# Patient Record
Sex: FEMALE | Race: WHITE | Employment: FULL TIME | URBAN - METROPOLITAN AREA
[De-identification: names, ages, dates, MRNs, and addresses within clinical notes are randomized per-mention and may not be internally consistent; named-entity substitution may affect disease eponyms.]

---

## 2017-01-04 ENCOUNTER — OFFICE VISIT (OUTPATIENT)
Dept: PSYCHIATRY | Facility: CLINIC | Age: 37
End: 2017-01-04
Payer: COMMERCIAL

## 2017-01-04 VITALS
DIASTOLIC BLOOD PRESSURE: 70 MMHG | BODY MASS INDEX: 19.88 KG/M2 | TEMPERATURE: 98.1 F | OXYGEN SATURATION: 95 % | SYSTOLIC BLOOD PRESSURE: 110 MMHG | HEIGHT: 62 IN | HEART RATE: 110 BPM | WEIGHT: 108 LBS

## 2017-01-04 DIAGNOSIS — F34.1 DYSTHYMIA: Primary | ICD-10-CM

## 2017-01-04 DIAGNOSIS — F34.1 DYSTHYMIC DISORDER: ICD-10-CM

## 2017-01-04 DIAGNOSIS — F41.9 ANXIETY: ICD-10-CM

## 2017-01-04 PROCEDURE — 99214 OFFICE O/P EST MOD 30 MIN: CPT | Performed by: NURSE PRACTITIONER

## 2017-01-04 RX ORDER — VENLAFAXINE HYDROCHLORIDE 37.5 MG/1
CAPSULE, EXTENDED RELEASE ORAL
Qty: 14 CAPSULE | Refills: 0 | Status: SHIPPED | OUTPATIENT
Start: 2017-01-04 | End: 2017-03-03

## 2017-01-04 RX ORDER — BUPROPION HYDROCHLORIDE 300 MG/1
300 TABLET ORAL EVERY MORNING
Qty: 90 TABLET | Refills: 1 | Status: SHIPPED | OUTPATIENT
Start: 2017-01-04 | End: 2017-10-19

## 2017-01-04 ASSESSMENT — ANXIETY QUESTIONNAIRES
5. BEING SO RESTLESS THAT IT IS HARD TO SIT STILL: NOT AT ALL
IF YOU CHECKED OFF ANY PROBLEMS ON THIS QUESTIONNAIRE, HOW DIFFICULT HAVE THESE PROBLEMS MADE IT FOR YOU TO DO YOUR WORK, TAKE CARE OF THINGS AT HOME, OR GET ALONG WITH OTHER PEOPLE: NOT DIFFICULT AT ALL
GAD7 TOTAL SCORE: 1
7. FEELING AFRAID AS IF SOMETHING AWFUL MIGHT HAPPEN: NOT AT ALL
6. BECOMING EASILY ANNOYED OR IRRITABLE: SEVERAL DAYS
3. WORRYING TOO MUCH ABOUT DIFFERENT THINGS: NOT AT ALL
1. FEELING NERVOUS, ANXIOUS, OR ON EDGE: NOT AT ALL
2. NOT BEING ABLE TO STOP OR CONTROL WORRYING: NOT AT ALL

## 2017-01-04 ASSESSMENT — PATIENT HEALTH QUESTIONNAIRE - PHQ9: 5. POOR APPETITE OR OVEREATING: NOT AT ALL

## 2017-01-04 NOTE — PROGRESS NOTES
"    Outpatient Psychiatric Progress Note    Name: Paula Ramos   : 1980  Date: 2017                         Primary Care Provider: Marcus Green MD  Therapist: None    PHQ-9 scores:  PHQ-9 SCORE 2016   Total Score 1 8 0       NIESHA-7 scores:  NIESHA-7 SCORE 2016   Total Score 0 6 1       Patient Identification:  Patient is a 36 year old year old, single  White American female  who presents for return visit with me.  Patient is currently employed full time. Patient attended the session alone. Patient prefers to be called: \"Paula\"    Interim History:    I last saw Paula Ramos for outpatient psychiatry Consultation on 2016.     During that appointment, we Continue Effexor (venlafaxine)  mg by mouth in AM.      Continue trazodone as needed for insomnia.       Start Wellbutrin (buproprion)  mg by mouth in AM.       Light box therapy- 10,000 lux in AM and PM for 20 - 40 minutes per session.      Consider increase of Vitamin D3 to 0755-2266 units per day.       Current medications include:   Current Outpatient Prescriptions   Medication Sig     docusate sodium (COLACE) 100 MG tablet Take 100 mg by mouth daily     calcium carbonate (OS-CARMEN 500 MG [place]. [place]) 500 MG tablet Take 500 mg by mouth 2 times daily     multivitamin, therapeutic with minerals (MULTI-VITAMIN) TABS Take 1 tablet by mouth daily     Cyanocobalamin (VITAMIN B 12 PO) Take by mouth daily     Omega-3 Fatty Acids (OMEGA-3 FISH OIL PO) Take by mouth daily     buPROPion (WELLBUTRIN XL) 150 MG 24 hr tablet Take 1 tablet (150 mg) by mouth every morning     venlafaxine (EFFEXOR-XR) 150 MG 24 hr capsule Take 1 capsule (150 mg) by mouth daily     tazarotene (TAZORAC) 0.1 % CREA Externally apply 1 applicator topically At Bedtime     spironolactone (ALDACTONE) 100 MG tablet Take 1 tablet (100 mg) by mouth daily     norethindrone-ethinyl estradiol (JUNEL FE 1/20) 1-20 MG-MCG per tablet " "Take 1 tablet by mouth daily     traZODone (DESYREL) 150 MG tablet TAKE 1/3 TAB PO QHS PRN SLEEP     No current facility-administered medications for this visit.       The [place] Prescription Monitoring Program has been reviewed and there are no concerns about diversionary activity for controlled substances at this time.      Paula DIOP Richard reports mood has been: \"better\"  Anxiety has been: \"better, no panic\"  Sleep has been: \"okay\" and has not needed to take trazodone.   PHQ9 and GAD7 scores were reviewed today.   Dry mouth lately. Gum, candy, water. Humidifier. No biotene. Has not gone to dentist.   Medication side effects: Yes: dry mouth  Current stressors include: None  Coping mechanisms and supports include: Hobbies and Friends    Past Medical History   Diagnosis Date     Depressive disorder, not elsewhere classified 2005     also has some anxiety     Other acne      Insomnia, unspecified      Cervical dysplasia      LEEP completed 2008     Clotting disorder (H)      ASCUS favor benign 8/7/2008     10/11/10 ASCUS, positive low risk HPV: repeat pap in 6 months 04/19/11 Normal pap: Repeat in 3-6 months, due by August 2011 01/23/12 Normal pap=repeat screening in 1 year.      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    Social History:  Current Living situation:  [place], [place] with self and two cats . Feels safe at home.  Employment: employed full time   Current use of drugs or alcohol: Denies   Tobacco use: No  Caffeine:  Yes  One  cups/day of coffee or pop  Recovery Programming Involvement: Not Applicable    Vital Signs:   /70 mmHg  Pulse 110  Temp(Src) 98.1  F (36.7  C) (Oral)  Ht 5' 2\" (1.575 m)  Wt 108 lb (48.988 kg)  BMI 19.75 kg/m2  SpO2 95%    Labs:  Most recent laboratory results reviewed and no new labs.    Review of Systems:  10 systems (general, cardiovascular, respiratory, eyes, ENT, endocrine, GI, , M/S, neurological) were reviewed. Most pertinent finding(s) is/are: no trouble swallowing, no dry skin, no rashes. The remaining systems are all unremarkable.    Mental Status Examination:  Appearance:  awake, alert, adequately groomed, appeared stated age and thin  Attitude:  cooperative and guarded    Eye Contact:  adequate  Gait and Station: Normal, No assistive Devices used and No dizziness or falls  Psychomotor Behavior:  no evidence of tardive dyskinesia, dystonia, or tics  Oriented to:  time, person, and place  Attention Span and Concentration:  normal or unremarkable  Speech:  clear, coherent, regular rate, regular rhythm and fluent  Language: Able to read and write  Mood:  \"better\"  Affect:  anxious, nervous and apprehensive and guarded  Associations:  no loose associations  Thought Process:  logical, linear and goal oriented  Thought Content:  no evidence of suicidal ideation or homicidal ideation, no evidence of psychotic thought, no auditory hallucinations present, no visual hallucinations present and Appropriate to Interview  Recent and Remote Memory:  intact Not formally assessed.  Fund of Knowledge: advanced  Insight:  good  Judgment:  intact  Impulse Control:  intact    Suicide Risk Assessment:  Today Paula Ramos reports chronic low mood. In addition, there are notable " risk factors for self-harm, including age, single status and anxiety However, risk is mitigated by commitment to family, sobriety, absence of past attempts, ability to volunteer a safety plan, history of seeking help when needed, future oriented, no access to firearms or weapons, denies suicidal intent or plan and no family history of suicide. Therefore, based on all available evidence including the factors cited above, Paula Ramos does not appear to be at imminent risk for self-harm, does not meet criteria for a 72-hr hold, and therefore remains appropriate for ongoing outpatient level of care.  A thorough assessment of risk factors related to suicide and self-harm have been reviewed and are noted above. The patient convincingly denies suicidality on several occasions. There was no deceit detected, and the patient presented in a manner that was believable.     DSM5 Diagnosis:  300.4 Persistent Depressive Disorder, With intermittent major depressive episodes, without current episode  300.02 (F41.1) Generalized Anxiety Disorder  Sleep Disturbance and Insomnia related to mental health condition    Medical comorbidities include:   Patient Active Problem List    Diagnosis Date Noted     Acne vulgaris 10/19/2015     Priority: Medium     Anxiety 02/18/2013     Priority: Medium     CARDIOVASCULAR SCREENING; LDL GOAL LESS THAN 160 10/31/2010     Priority: Medium     Dysthymic disorder 11/06/2006     Priority: Medium       Psychosocial & Contextual Factors:  Symptoms    Assessment:  Paula Ramos reports improvement in mood and anxiety and is tolerating Wellbutrin (buproprion) except dry mouth. Encouraged health promotion activities and follow up with dentist. Reviewed other medication options such as pilocarpine. Medication side effects and alternatives were reviewed.       Treatment Plan:    Discontinue trazodone.     Reduce Effexor (venlafaxine) to 75 mg by mouth daily for 2 weeks, then reduce to 37.5 mg by mouth  daily for 1-2 weeks then stop.     Increase Wellbutrin (buproprion) to 300 mg by mouth daily in AM.     Continue all other medications as reviewed per electronic medical record today.     All questions addressed. Education and counseling completed regarding risks and benefits of medications and psychotherapy options.    Safety plan was reviewed. To the Emergency Department as needed or call after hours crisis line at [phone] or [phone].     To schedule individual or family therapy, call [facility] at [phone].    Consider Light box therapy- 10,000 lux in AM and PM for 20 - 40 minutes per session.    Consider increase of Vitamin D3 to 8010-0581 units per day.      Call [facility] at [phone] in about one month to update us on how medication changes are going and dry mouth.     Follow up with me as needed. Call [facility] at [phone] to schedule.     Follow up with primary care provider as planned or for acute medical concerns.    Call the psychiatric nurse line with medication questions or concerns at [phone].    My Practice Policy was reviewed and signed: YES     MyChart may be used to communicate with your provider, but this is not intended to be used for emergencies.    Administrative Billing:   Time spent with patient was 30 minutes and greater than 50% of time or 20 minutes was spent in counseling and coordination of care.    Signed:   Ricarda Berry, PhD, APRN, CNP   Psychiatry

## 2017-01-04 NOTE — Clinical Note
Psychiatry update. I may return her care back to you soon. She is doing well except side effect of dry mouth that we will work through. Ricarda

## 2017-01-04 NOTE — MR AVS SNAPSHOT
After Visit Summary   1/4/2017    Paula Ramos    MRN: [id number]           Patient Information     Date Of Birth          1980        Visit Information        Provider Department      1/4/2017 2:15 PM Ricarda Berry NP [facility]        Today's Diagnoses     Dysthymia    -  1     Dysthymic disorder         Anxiety           Care Instructions    Treatment Plan:    Reduce Effexor (venlafaxine) to 75 mg by mouth daily for 2 weeks, then reduce to 37.5 mg by mouth daily for 1-2 weeks then stop.     Increase Wellbutrin (buproprion) to 300 mg by mouth daily in AM.     Continue all other medications as reviewed per electronic medical record today.     All questions addressed. Education and counseling completed regarding risks and benefits of medications and psychotherapy options.    Safety plan was reviewed. To the Emergency Department as needed or call after hours crisis line at [phone] or [phone].     To schedule individual or family therapy, call [facility] at [phone].    Call [facility] at [phone] in about one month to update us on how medication changes are going and dry mouth.     Follow up with me as needed. Call [facility] at [phone] to schedule.     Follow up with primary care provider as planned or for acute medical concerns.    Call the psychiatric nurse line with medication questions or concerns at [phone].    My Practice Policy was reviewed and signed: YES     MyChart may be used to communicate with your provider, but this is not intended to be used for emergencies.        Follow-ups after your visit        Who to contact     If you have questions or need follow up information about today's clinic visit or your schedule please contact [facility] directly at [phone].  Normal or non-critical lab and imaging results will be communicated to you by MyChart, letter or phone  "within 4 business days after the clinic has received the results. If you do not hear from us within 7 days, please contact the clinic through [employer] or phone. If you have a critical or abnormal lab result, we will notify you by phone as soon as possible.  Submit refill requests through [employer] or call your pharmacy and they will forward the refill request to us. Please allow 3 business days for your refill to be completed.          Additional Information About Your Visit        [employer] Information     [employer] gives you secure access to your electronic health record. If you see a primary care provider, you can also send messages to your care team and make appointments. If you have questions, please call your primary care clinic.  If you do not have a primary care provider, please call [phone] and they will assist you.        Care EveryWhere ID     This is your Care EveryWhere ID. This could be used by other organizations to access your [facility] medical records  [id number]        Your Vitals Were     Pulse Temperature Height BMI (Body Mass Index) Pulse Oximetry       110 98.1  F (36.7  C) (Oral) 5' 2\" (1.575 m) 19.75 kg/m2 95%        Blood Pressure from Last 3 Encounters:   01/04/17 110/70   11/14/16 120/70   09/29/16 120/84    Weight from Last 3 Encounters:   01/04/17 108 lb (48.988 kg)   11/14/16 112 lb (50.803 kg)   09/29/16 112 lb 4.8 oz (50.939 kg)              Today, you had the following     No orders found for display         Today's Medication Changes          These changes are accurate as of: 1/4/17  2:48 PM.  If you have any questions, ask your nurse or doctor.               These medicines have changed or have updated prescriptions.        Dose/Directions    buPROPion 300 MG 24 hr tablet   Commonly known as:  WELLBUTRIN XL   This may have changed:    - medication strength  - how much to take  - additional instructions   Used for:  Dysthymia   Changed by:  Ricarda Berry, NP        Dose:  300 mg "   Take 1 tablet (300 mg) by mouth every morning Increased dose. Fill at patient request.   Quantity:  90 tablet   Refills:  1       venlafaxine 37.5 MG 24 hr capsule   Commonly known as:  EFFEXOR-XR   This may have changed:    - medication strength  - how much to take  - how to take this  - when to take this  - additional instructions   Used for:  Dysthymic disorder, Anxiety   Changed by:  Ricarda Berry, PEG        Reduce dose to 75 mg by mouth daily for 2 weeks then reduce to 37.5 mg by mouth daily for 1-2 weeks then stop.   Quantity:  14 capsule   Refills:  0            Where to get your medicines      These medications were sent to [id number] - [place], [place] - [address]  [address] [address]     Phone:  [phone]    - buPROPion 300 MG 24 hr tablet  - venlafaxine 37.5 MG 24 hr capsule             Primary Care Provider Office Phone # Fax #    Marcus Green [phone]       [address] [address]        Thank you!     Thank you for choosing [facility]  for your care. Our goal is always to provide you with excellent care. Hearing back from our patients is one way we can continue to improve our services. Please take a few minutes to complete the written survey that you may receive in the mail after your visit with us. Thank you!             Your Updated Medication List - Protect others around you: Learn how to safely use, store and throw away your medicines at www.disposemymeds.org.          This list is accurate as of: 1/4/17  2:48 PM.  Always use your most recent med list.                   Brand Name Dispense Instructions for use    buPROPion 300 MG 24 hr tablet    WELLBUTRIN XL    90 tablet    Take 1 tablet (300 mg) by mouth every morning Increased dose. Fill at patient request.       calcium carbonate 500 MG tablet    OS-CARMEN 500 mg [place]. [place]     Take 500 mg by mouth 2 times daily       docusate sodium 100  MG tablet    COLACE     Take 100 mg by mouth daily       Multi-vitamin Tabs tablet      Take 1 tablet by mouth daily       norethindrone-ethinyl estradiol 1-20 MG-MCG per tablet    JUNEL FE 1/20    84 tablet    Take 1 tablet by mouth daily       OMEGA-3 FISH OIL PO      Take by mouth daily       spironolactone 100 MG tablet    ALDACTONE    90 tablet    Take 1 tablet (100 mg) by mouth daily       tazarotene 0.1 % Crea    TAZORAC    30 g    Externally apply 1 applicator topically At Bedtime       venlafaxine 37.5 MG 24 hr capsule    EFFEXOR-XR    14 capsule    Reduce dose to 75 mg by mouth daily for 2 weeks then reduce to 37.5 mg by mouth daily for 1-2 weeks then stop.       VITAMIN B 12 PO      Take by mouth daily

## 2017-01-04 NOTE — PATIENT INSTRUCTIONS
Treatment Plan:    Reduce Effexor (venlafaxine) to 75 mg by mouth daily for 2 weeks, then reduce to 37.5 mg by mouth daily for 1-2 weeks then stop.     Increase Wellbutrin (buproprion) to 300 mg by mouth daily in AM.     Continue all other medications as reviewed per electronic medical record today.     All questions addressed. Education and counseling completed regarding risks and benefits of medications and psychotherapy options.    Safety plan was reviewed. To the Emergency Department as needed or call after hours crisis line at [phone] or [phone].     To schedule individual or family therapy, call [facility] at [phone].    Call [facility] at [phone] in about one month to update us on how medication changes are going and dry mouth.     Follow up with me as needed. Call [facility] at [phone] to schedule.     Follow up with primary care provider as planned or for acute medical concerns.    Call the psychiatric nurse line with medication questions or concerns at [phone].    My Practice Policy was reviewed and signed: YES     MyChart may be used to communicate with your provider, but this is not intended to be used for emergencies.

## 2017-01-04 NOTE — NURSING NOTE
"Chief Complaint   Patient presents with     RECHECK     dry mouth     initial /70 mmHg  Pulse 110  Temp(Src) 98.1  F (36.7  C) (Oral)  Ht 5' 2\" (1.575 m)  Wt 108 lb (48.988 kg)  BMI 19.75 kg/m2  SpO2 95% Estimated body mass index is 19.75 kg/(m^2) as calculated from the following:    Height as of this encounter: 5' 2\" (1.575 m).    Weight as of this encounter: 108 lb (48.988 kg)..  bp completed using cuff size regular    "

## 2017-01-05 ASSESSMENT — ANXIETY QUESTIONNAIRES: GAD7 TOTAL SCORE: 1

## 2017-01-05 ASSESSMENT — PATIENT HEALTH QUESTIONNAIRE - PHQ9: SUM OF ALL RESPONSES TO PHQ QUESTIONS 1-9: 0

## 2017-02-22 DIAGNOSIS — L70.0 ACNE VULGARIS: ICD-10-CM

## 2017-02-22 RX ORDER — SPIRONOLACTONE 100 MG/1
100 TABLET, FILM COATED ORAL DAILY
Qty: 90 TABLET | Refills: 3 | Status: CANCELLED | OUTPATIENT
Start: 2017-02-22

## 2017-02-22 NOTE — TELEPHONE ENCOUNTER
spironolactone (ALDACTONE) 100 MG tablet        Last Written Prescription Date: 9/29/16  Last Fill Quantity: 90,  # refills: 3   Last Office Visit with [facility], [facility] or [facility] prescribing provider: 9/26/16

## 2017-02-28 DIAGNOSIS — F34.1 DYSTHYMIC DISORDER: ICD-10-CM

## 2017-02-28 DIAGNOSIS — F41.9 ANXIETY: ICD-10-CM

## 2017-02-28 NOTE — TELEPHONE ENCOUNTER
"Note from pharmacy- \"Dr. Ricarda Quarles DC'd but patient says she needs more time to taper off. Patient requesting 37.5 mg/day\"    venlafaxine (EFFEXOR-XR) 37.5 MG 24 hr capsule     Last Written Prescription Date: 1/4/17  Last Fill Quantity: 14, # refills: 0  Last Office Visit with [facility] primary care provider:  9/29/16        Last PHQ-9 score on record=   PHQ-9 SCORE 1/4/2017   Total Score -   Total Score 0                 "

## 2017-03-02 RX ORDER — VENLAFAXINE HYDROCHLORIDE 37.5 MG/1
CAPSULE, EXTENDED RELEASE ORAL
Qty: 14 CAPSULE | Refills: 0 | Status: CANCELLED | OUTPATIENT
Start: 2017-03-02

## 2017-03-02 NOTE — TELEPHONE ENCOUNTER
Brian Gallardo  This patient can call our psychiatry nurse line in the future if there are concerns about her medications. The number is [phone]. Please send in 14 capsules of Effexor (venlafaxine) XR 37.5 mg. She is to take the medication daily for one week, then every other day until medication is gone. If this is not helpful for her in the upcoming weeks, she needs to make an appointment with me so we can discuss further steps.  Thank you  Ricarda

## 2017-03-02 NOTE — TELEPHONE ENCOUNTER
Please see pt. Request to continue taper of Effexor. Please approve if appropriate.    Paulina De La Torre RN, BSN, PHN

## 2017-03-03 RX ORDER — VENLAFAXINE HYDROCHLORIDE 37.5 MG/1
CAPSULE, EXTENDED RELEASE ORAL
Qty: 14 CAPSULE | Refills: 0 | Status: SHIPPED | OUTPATIENT
Start: 2017-03-03 | End: 2017-07-19

## 2017-03-03 NOTE — TELEPHONE ENCOUNTER
LM on  to call back. Please inform pt. Of note below.     FYI- Medication has been sent into [facility] pharmacy.     Paulina De La Torre RN, BSN, PHN

## 2017-04-13 ENCOUNTER — TELEPHONE (OUTPATIENT)
Dept: OTHER | Facility: CLINIC | Age: 37
End: 2017-04-13

## 2017-04-13 NOTE — TELEPHONE ENCOUNTER
4/13/2017    Call Regarding Onboarding [employer]    Attempt 1    Message on voicemail     Comments: NO DEP        Outreach   Ricarda Holley

## 2017-05-31 NOTE — TELEPHONE ENCOUNTER
Call Regarding Onboarding [employer]    Attempt 2    Message on voicemail     Comments:       Outreach   Anjana Arciniega

## 2017-06-08 NOTE — TELEPHONE ENCOUNTER
6/8/2017    Call Regarding Onboarding [employer] choice silver    Attempt 3    Message on voicemail     Comments: No dep      Outreach   cnt

## 2017-07-19 DIAGNOSIS — F34.1 DYSTHYMIC DISORDER: ICD-10-CM

## 2017-07-19 DIAGNOSIS — F41.9 ANXIETY: ICD-10-CM

## 2017-07-19 NOTE — TELEPHONE ENCOUNTER
Venlafaxine    Last Written Prescription Date: 03/03/2017  Last Fill Quantity: 14, # refills: 0  Last Office Visit with G, P or [facility] prescribing provider: 9/26/2016-Dr Green        BP Readings from Last 3 Encounters:   01/04/17 110/70   11/14/16 120/70   09/29/16 120/84     Pulse: (for Fetzima)  Creatinine   Date Value Ref Range Status   09/29/2016 0.89 0.52 - 1.04 mg/dL Final   ]    Last PHQ-9 score on record=   PHQ-9 SCORE 1/4/2017   Total Score -   Total Score 0

## 2017-07-20 DIAGNOSIS — L70.0 ACNE VULGARIS: ICD-10-CM

## 2017-07-20 RX ORDER — VENLAFAXINE HYDROCHLORIDE 37.5 MG/1
CAPSULE, EXTENDED RELEASE ORAL
Status: SHIPPED
Start: 2017-07-20 | End: 2017-10-19

## 2017-07-20 NOTE — TELEPHONE ENCOUNTER
norethindrone-ethinyl estradiol (JUNEL FE 1/20) 1-20 MG-MCG per tablet        Last Written Prescription Date: 09/29/16  Last Fill Quantity: 84,  # refills: 3   Last Office Visit with G, [facility] or [facility] prescribing provider: Dr. Green, 09/29/16

## 2017-07-20 NOTE — TELEPHONE ENCOUNTER
Note to pharmacy.  This is to be filled by Ricarda Quarles.  Pati Welsh, Paulina Sinha RN        11:41 AM   Note      LM on VM to call back. Please inform pt. Of note below.      FYI- Medication has been sent into [facility] pharmacy.      Paulina De La Torre RN, BSN, PHN            March 2, 2017   Ricarda Berry, NP   to Paulina De La Torre RN        3:25 PM   Note      Brian Gallardo  This patient can call our psychiatry nurse line in the future if there are concerns about her medications. The number is [phone]. Please send in 14 capsules of Effexor (venlafaxine) XR 37.5 mg. She is to take the medication daily for one week, then every other day until medication is gone. If this is not helpful for her in the upcoming weeks, she needs to make an appointment with me so we can discuss further steps.  Thank you  Ricarda                  2:30 PM   Paulina De La Torre RN routed this conversation to Ricarda Berry NP [place], Felicia R, RN        2:28 PM   Note      Please see pt. Request to continue taper of Effexor. Please approve if appropriate.     Paulina De La Torre RN, BSN, PHN                     1:29 PM   Marcus Green MD routed this conversation to Marcus Holcomb MD        1:28 PM   Note      I must defer to Ricarda Berry  That's why I referred her  Declined  Marcus Green

## 2017-07-20 NOTE — LETTER
[facility]  [address] [place], [address]  [phone]        July 25, 2017    Paula Ramos                                                                                                                               [address] [address] [address]            We recently received a call from your pharmacy requesting a refill of Junel.     A review of your chart indicates that an appointment is required with your provider for yearly physical when next refill due. Please call the clinic at [phone] to schedule your appointment.     We have authorized one 3 month refill of your medication to allow time for you to schedule your appointment.      Taking care of your health is important to us and ongoing visits with your provider are vital to your care.  We look forward to seeing you in the near future.     Sincerely,     [facility]

## 2017-07-23 ENCOUNTER — NURSE TRIAGE (OUTPATIENT)
Dept: NURSING | Facility: CLINIC | Age: 37
End: 2017-07-23

## 2017-07-25 RX ORDER — NORETHINDRONE ACETATE AND ETHINYL ESTRADIOL 1MG-20(21)
1 KIT ORAL DAILY
Qty: 84 TABLET | Refills: 0 | Status: SHIPPED | OUTPATIENT
Start: 2017-07-25 | End: 2017-10-19

## 2017-08-29 ENCOUNTER — TELEPHONE (OUTPATIENT)
Dept: PSYCHIATRY | Facility: CLINIC | Age: 37
End: 2017-08-29

## 2017-08-29 NOTE — TELEPHONE ENCOUNTER
Pharmacy requested refill of Effexor. This medication was tapered and discontinued according the the January 4, 2017 treatment plan from Ricarda Berry, PhD, APRN, CNP. Patient has not seen provider since that visit. She will need to schedule an appointment prior to any refills or addition of any new medications. Pharmacy was notified.

## 2017-10-19 ENCOUNTER — OFFICE VISIT (OUTPATIENT)
Dept: FAMILY MEDICINE | Facility: CLINIC | Age: 37
End: 2017-10-19
Payer: COMMERCIAL

## 2017-10-19 VITALS
HEART RATE: 98 BPM | HEIGHT: 62 IN | RESPIRATION RATE: 16 BRPM | WEIGHT: 114 LBS | DIASTOLIC BLOOD PRESSURE: 77 MMHG | SYSTOLIC BLOOD PRESSURE: 111 MMHG | TEMPERATURE: 97.9 F | BODY MASS INDEX: 20.98 KG/M2

## 2017-10-19 DIAGNOSIS — Z23 NEED FOR TDAP VACCINATION: ICD-10-CM

## 2017-10-19 DIAGNOSIS — L70.0 ACNE VULGARIS: ICD-10-CM

## 2017-10-19 DIAGNOSIS — G47.10 HYPERSOMNIA: ICD-10-CM

## 2017-10-19 DIAGNOSIS — F34.1 DYSTHYMIA: ICD-10-CM

## 2017-10-19 DIAGNOSIS — K13.0 ANGULAR CHEILOSIS: ICD-10-CM

## 2017-10-19 DIAGNOSIS — F41.9 ANXIETY: ICD-10-CM

## 2017-10-19 DIAGNOSIS — F33.41 RECURRENT MAJOR DEPRESSIVE DISORDER, IN PARTIAL REMISSION (H): Primary | ICD-10-CM

## 2017-10-19 LAB
ANION GAP SERPL CALCULATED.3IONS-SCNC: 12 MMOL/L (ref 3–14)
BUN SERPL-MCNC: 14 MG/DL (ref 7–30)
CALCIUM SERPL-MCNC: 9.2 MG/DL (ref 8.5–10.1)
CHLORIDE SERPL-SCNC: 103 MMOL/L (ref 94–109)
CO2 SERPL-SCNC: 21 MMOL/L (ref 20–32)
CREAT SERPL-MCNC: 0.99 MG/DL (ref 0.52–1.04)
GFR SERPL CREATININE-BSD FRML MDRD: 62 ML/MIN/1.7M2
GLUCOSE SERPL-MCNC: 76 MG/DL (ref 70–99)
POTASSIUM SERPL-SCNC: 4.3 MMOL/L (ref 3.4–5.3)
SODIUM SERPL-SCNC: 136 MMOL/L (ref 133–144)
TSH SERPL DL<=0.005 MIU/L-ACNC: 1.83 MU/L (ref 0.4–4)

## 2017-10-19 PROCEDURE — 36415 COLL VENOUS BLD VENIPUNCTURE: CPT | Performed by: FAMILY MEDICINE

## 2017-10-19 PROCEDURE — 90715 TDAP VACCINE 7 YRS/> IM: CPT | Performed by: FAMILY MEDICINE

## 2017-10-19 PROCEDURE — 84443 ASSAY THYROID STIM HORMONE: CPT | Performed by: FAMILY MEDICINE

## 2017-10-19 PROCEDURE — 90471 IMMUNIZATION ADMIN: CPT | Performed by: FAMILY MEDICINE

## 2017-10-19 PROCEDURE — 80048 BASIC METABOLIC PNL TOTAL CA: CPT | Performed by: FAMILY MEDICINE

## 2017-10-19 PROCEDURE — 99214 OFFICE O/P EST MOD 30 MIN: CPT | Mod: 25 | Performed by: FAMILY MEDICINE

## 2017-10-19 RX ORDER — BUPROPION HYDROCHLORIDE 150 MG/1
TABLET ORAL
COMMUNITY
Start: 2017-01-26 | End: 2017-10-19

## 2017-10-19 RX ORDER — BUPROPION HYDROCHLORIDE 150 MG/1
TABLET ORAL
Qty: 30 TABLET | Status: CANCELLED | OUTPATIENT
Start: 2017-10-19

## 2017-10-19 RX ORDER — BUPROPION HYDROCHLORIDE 150 MG/1
150 TABLET ORAL EVERY MORNING
Qty: 90 TABLET | Refills: 1 | Status: SHIPPED | OUTPATIENT
Start: 2017-10-19

## 2017-10-19 RX ORDER — BUPROPION HYDROCHLORIDE 300 MG/1
300 TABLET ORAL EVERY MORNING
Qty: 90 TABLET | Refills: 1 | Status: CANCELLED | OUTPATIENT
Start: 2017-10-19

## 2017-10-19 RX ORDER — NORETHINDRONE ACETATE AND ETHINYL ESTRADIOL 1MG-20(21)
1 KIT ORAL DAILY
Qty: 84 TABLET | Refills: 1 | Status: SHIPPED | OUTPATIENT
Start: 2017-10-19 | End: 2018-02-19

## 2017-10-19 RX ORDER — SPIRONOLACTONE 100 MG/1
100 TABLET, FILM COATED ORAL DAILY
Qty: 90 TABLET | Refills: 1 | Status: SHIPPED | OUTPATIENT
Start: 2017-10-19

## 2017-10-19 ASSESSMENT — PATIENT HEALTH QUESTIONNAIRE - PHQ9
5. POOR APPETITE OR OVEREATING: NOT AT ALL
SUM OF ALL RESPONSES TO PHQ QUESTIONS 1-9: 6

## 2017-10-19 ASSESSMENT — ANXIETY QUESTIONNAIRES
6. BECOMING EASILY ANNOYED OR IRRITABLE: MORE THAN HALF THE DAYS
7. FEELING AFRAID AS IF SOMETHING AWFUL MIGHT HAPPEN: NOT AT ALL
3. WORRYING TOO MUCH ABOUT DIFFERENT THINGS: SEVERAL DAYS
2. NOT BEING ABLE TO STOP OR CONTROL WORRYING: NOT AT ALL
1. FEELING NERVOUS, ANXIOUS, OR ON EDGE: SEVERAL DAYS
5. BEING SO RESTLESS THAT IT IS HARD TO SIT STILL: NOT AT ALL
GAD7 TOTAL SCORE: 4
IF YOU CHECKED OFF ANY PROBLEMS ON THIS QUESTIONNAIRE, HOW DIFFICULT HAVE THESE PROBLEMS MADE IT FOR YOU TO DO YOUR WORK, TAKE CARE OF THINGS AT HOME, OR GET ALONG WITH OTHER PEOPLE: NOT DIFFICULT AT ALL

## 2017-10-19 NOTE — NURSING NOTE
"Chief Complaint   Patient presents with     Recheck Medication     refill Wellbutrin        Initial /77 (BP Location: Right arm, Patient Position: Chair, Cuff Size: Adult Regular)  Pulse 98  Temp 97.9  F (36.6  C) (Oral)  Resp 16  Ht 5' 2\" (1.575 m)  Wt 114 lb (51.7 kg)  Breastfeeding? No  BMI 20.85 kg/m2 Estimated body mass index is 20.85 kg/(m^2) as calculated from the following:    Height as of this encounter: 5' 2\" (1.575 m).    Weight as of this encounter: 114 lb (51.7 kg).  Medication Reconciliation: complete rt arm Jessica Jimenez MA      "

## 2017-10-19 NOTE — MR AVS SNAPSHOT
After Visit Summary   10/19/2017    Paula Ramos    MRN: [id number]           Patient Information     Date Of Birth          1980        Visit Information        Provider Department      10/19/2017 2:45 PM Marcus Green MD [facility]        Today's Diagnoses     Recurrent major depressive disorder, in partial remission (H)    -  1    Anxiety        Dysthymia        Acne vulgaris        Need for Tdap vaccination        Hypersomnia        Angular cheilosis          Care Instructions    30-60 days    2 different antifungals          Follow-ups after your visit        Additional Services     SLEEP EVALUATION & MANAGEMENT REFERRAL - [facility]  [phone] (Age 18 and up)       Please be aware that coverage of these services is subject to the terms and limitations of your health insurance plan.  Call member services at your health plan with any benefit or coverage questions.      Please bring the following to your appointment:    >>   List of current medications   >>   This referral request   >>   Any documents/labs given to you for this referral                      Future tests that were ordered for you today     Open Future Orders        Priority Expected Expires Ordered    SLEEP EVALUATION & MANAGEMENT REFERRAL - [facility]  [phone] (Age 18 and up) Routine  10/19/2018 10/19/2017            Who to contact     If you have questions or need follow up information about today's clinic visit or your schedule please contact [facility] directly at [phone].  Normal or non-critical lab and imaging results will be communicated to you by MyChart, letter or phone within 4 business days after the clinic has received the results. If you do not hear from us within 7 days, please contact the clinic through MyChart or phone. If you have a critical or abnormal lab result, we will notify you by phone  "as soon as possible.  Submit refill requests through [employer] or call your pharmacy and they will forward the refill request to us. Please allow 3 business days for your refill to be completed.          Additional Information About Your Visit        [employer] Information     [employer] gives you secure access to your electronic health record. If you see a primary care provider, you can also send messages to your care team and make appointments. If you have questions, please call your primary care clinic.  If you do not have a primary care provider, please call [phone] and they will assist you.        Care EveryWhere ID     This is your Care EveryWhere ID. This could be used by other organizations to access your [place] medical records  [id number]        Your Vitals Were     Pulse Temperature Respirations Height Breastfeeding? BMI (Body Mass Index)    98 97.9  F (36.6  C) (Oral) 16 5' 2\" (1.575 m) No 20.85 kg/m2       Blood Pressure from Last 3 Encounters:   10/19/17 111/77   01/04/17 110/70   11/14/16 120/70    Weight from Last 3 Encounters:   10/19/17 114 lb (51.7 kg)   01/04/17 108 lb (49 kg)   11/14/16 112 lb (50.8 kg)              We Performed the Following     Basic metabolic panel  (Ca, Cl, CO2, Creat, Gluc, K, Na, BUN)     TDAP VACCINE (ADACEL)     TSH with free T4 reflex          Today's Medication Changes          These changes are accurate as of: 10/19/17  7:13 PM.  If you have any questions, ask your nurse or doctor.               These medicines have changed or have updated prescriptions.        Dose/Directions    buPROPion 150 MG 24 hr tablet   Commonly known as:  WELLBUTRIN XL   This may have changed:    - how much to take  - how to take this  - when to take this   Used for:  Recurrent major depressive disorder, in partial remission (H), Anxiety   Changed by:  Marcus Green MD        Dose:  150 mg   Take 1 tablet (150 mg) by mouth every morning   Quantity:  90 tablet   Refills:  1         Stop " taking these medicines if you haven't already. Please contact your care team if you have questions.     venlafaxine 37.5 MG 24 hr capsule   Commonly known as:  EFFEXOR-XR   Stopped by:  Marcus Green MD                Where to get your medicines      These medications were sent to [facility] - [place], [place] - [address]  [address] [facility] [address]     Phone:  [phone]     buPROPion 150 MG 24 hr tablet    norethindrone-ethinyl estradiol 1-20 MG-MCG per tablet    spironolactone 100 MG tablet                Primary Care Provider Office Phone # Fax #    Marcus Green [phone] [address]  [facility] [address]        Equal Access to Services     [facility]: Hadii avis vega hadasho Soomaali, waaxda luqadaha, qaybta kaalmada adeegyada, dieudonne zabala . So [facility] [phone].    ATENCIÓN: Si habla español, tiene a sterling disposición servicios gratuitos de asistencia lingüística. Ll[facility] [phone].    We comply with applicable federal civil rights laws and [place] laws. We do not discriminate on the basis of race, color, national origin, age, disability, sex, sexual orientation, or gender identity.            Thank you!     Thank you for choosing [facility]  for your care. Our goal is always to provide you with excellent care. Hearing back from our patients is one way we can continue to improve our services. Please take a few minutes to complete the written survey that you may receive in the mail after your visit with us. Thank you!             Your Updated Medication List - Protect others around you: Learn how to safely use, store and throw away your medicines at www.disposemymeds.org.          This list is accurate as of: 10/19/17  7:13 PM.  Always use your most recent med list.                   Brand Name Dispense Instructions for use Diagnosis    buPROPion 150 MG 24 hr tablet    WELLBUTRIN XL    90 tablet     Take 1 tablet (150 mg) by mouth every morning    Recurrent major depressive disorder, in partial remission (H), Anxiety       calcium carbonate 1250 MG tablet    OS-CARMEN 500 mg [place]. Ca     Take 500 mg by mouth 2 times daily        docusate sodium 100 MG tablet    COLACE     Take 100 mg by mouth daily        Multi-vitamin Tabs tablet      Take 1 tablet by mouth daily        norethindrone-ethinyl estradiol 1-20 MG-MCG per tablet    JUNEL FE 1/20    84 tablet    Take 1 tablet by mouth daily    Acne vulgaris       OMEGA-3 FISH OIL PO      Take by mouth daily        spironolactone 100 MG tablet    ALDACTONE    90 tablet    Take 1 tablet (100 mg) by mouth daily    Acne vulgaris       tazarotene 0.1 % Crea    TAZORAC    30 g    Externally apply 1 applicator topically At Bedtime    Acne vulgaris       VITAMIN B 12 PO      Take by mouth daily

## 2017-10-19 NOTE — PROGRESS NOTES
SUBJECTIVE:   Paula Ramos is a 37 year old female who presents to clinic today for the following health issues:      Medication Followup of Wellbutrin    Taking Medication as prescribed: yes    Side Effects:  Dry mouth    Medication Helping Symptoms:  yes     Recurrent major depressive disorder, in partial remission (H)  (primary encounter diagnosis)PHQ9=6  Anxiety  Dysthymia previous diagnoses. She wants to reduce bupropioin to 150 because of xerostomia  Patient says her depression and anxiety symptoms are controlled by her Bupropion 300 mg. However, she wishes to reduce her dose to 150 mg due to side effects.     Acne vulgaris: Patient doesn't wish to reduce her spironolactone, another potential culprit for dry mouth, agrees her acne looks well controlled.     Need for Tdap vaccination: Patient agreed to receive    Hypersomnia: The patient states she always sleeps too much, around 12 hours daily lifelong, requests sleep eval    Angular cheilosis: Noted during visit      Problem list and histories reviewed & adjusted, as indicated.  Additional history: none    Reviewed and updated as needed this visit by clinical staffTobacco  Allergies  Meds  Med Hx  Surg Hx  Fam Hx  Soc Hx       Past Medical History:   Diagnosis Date     Acne 11/19/2013     Acne vulgaris 10/19/2015     Anemia 9/4/2013     ASCUS favor benign 8/7/2008    10/11/10 ASCUS, positive low risk HPV: repeat pap in 6 months 04/19/11 Normal pap: Repeat in 3-6 months, due by August 2011 01/23/12 Normal pap=repeat screening in 1 year.      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     Dysthymic disorder 11/6/2006     Insomnia, unspecified      Other acne      Superficial thrombophlebitis      Thrombocytopenia (H) 5/30/2013  "      Past Surgical History:   Procedure Laterality Date     C NONSPECIFIC PROCEDURE  1999    jaw surgery     DILATION AND CURETTAGE, HYSTEROSCOPY DIAGNOSTIC, COMBINED  8/20/2013    Procedure: COMBINED DILATION AND CURETTAGE, HYSTEROSCOPY DIAGNOSTIC;  Diagnostic Hysteroscopy & Removal of Retained Portion of Intrauterine Device;  Surgeon: Topher Santiago MD;  Location: [facility] OR     HC SUCT AMARJIT LIPECTOMY,LOW EXTREM       LEEP TX, CERVICAL  2/2008    LEEP TX Cervical     TONSILLECTOMY  2004       Family History   Problem Relation Age of Onset     Hypertension Mother      Lipids Mother      Depression Sister      Hypertension Sister        Social History   Substance Use Topics     Smoking status: Never Smoker     Smokeless tobacco: Never Used     Alcohol use Yes      Comment: rarely        Reviewed and updated as needed this visit by Provider         ROS:  Dry mouth, no daytime fatigue    This document serves as a record of the services and decisions personally performed and made by Marcus Green MD. It was created on his behalf by Deana Hernandez, a trained medical scribe.  The creation of this document is based on the scribe's personal observations and the provider's statements to the medical scribe.  Deana Hernandez, October 19, 2017 3:02 PM    OBJECTIVE:     /77 (BP Location: Right arm, Patient Position: Chair, Cuff Size: Adult Regular)  Pulse 98  Temp 97.9  F (36.6  C) (Oral)  Resp 16  Ht 1.575 m (5' 2\")  Wt 51.7 kg (114 lb)  Breastfeeding? No  BMI 20.85 kg/m2  Body mass index is 20.85 kg/(m^2).    Skin no acne. Angular cheilosis      ASSESSMENT/PLAN:   ASSESSMENT / PLAN:  (F33.41) Recurrent major depressive disorder, in partial remission (H)  (primary encounter diagnosis)  Comment:  Stable, PHQ-9 of 6. Her only positive answers were related to sleep.   Plan: buPROPion (WELLBUTRIN XL) 150 MG 24 hr tablet,         TSH with free T4 reflex            (F41.9) Anxiety  Comment:   NIESHA-7 SCORE 9/29/2016 " 11/14/2016 1/4/2017   Total Score - - -   Total Score 0 6 1     Patient has been stable for a while.   Plan: buPROPion (WELLBUTRIN XL) 150 MG 24 hr tablet            (F34.1) Dysthymia  Comment: see above  Plan: change dx    (L70.0) Acne vulgaris  Comment: Improved; patient satisfied with treatment.   Plan: norethindrone-ethinyl estradiol (JUNEL FE 1/20)        1-20 MG-MCG per tablet, spironolactone         (ALDACTONE) 100 MG tablet, Basic metabolic         panel  (Ca, Cl, CO2, Creat, Gluc, K, Na, BUN)            (Z23) Need for Tdap vaccination  Comment: Patient received at appointment      (G47.10) Hypersomnia  Comment: Bothersome enough to warrant sleep medicine referral   Plan: SLEEP EVALUATION & MANAGEMENT REFERRAL - [facility]         [facility]          [phone] (Age 18 and up)            (K13.0) Angular cheilosis  Comment: Just noticed at appointment  Plan: Two different OTC antifungals to optimize treatment      RTC 6 months  If doing well    The information in this document, created by the medical scribe for me, accurately reflects the services I personally performed and the decisions made by me. I have reviewed and approved this document for accuracy prior to leaving the patient care area.  Marcus Green MD October 19, 2017 3:02 PM    Marcus Green MD  [facility]

## 2017-10-20 ASSESSMENT — ANXIETY QUESTIONNAIRES: GAD7 TOTAL SCORE: 4

## 2018-02-19 DIAGNOSIS — L70.0 ACNE VULGARIS: ICD-10-CM

## 2018-02-19 RX ORDER — NORETHINDRONE ACETATE AND ETHINYL ESTRADIOL AND FERROUS FUMARATE 1MG-20(21)
KIT ORAL
Qty: 84 TABLET | Refills: 0 | Status: SHIPPED | OUTPATIENT
Start: 2018-02-19

## 2018-02-19 NOTE — TELEPHONE ENCOUNTER
"Requested Prescriptions   Pending Prescriptions Disp Refills     MICROGESTIN FE 1/20 1-20 MG-MCG per tablet [Pharmacy Med Name: MICROGESTIN 1/20 FE TABS  28S] 84 tablet 0    Last Written Prescription Date:  10/19/2017  Last Fill Quantity: 84 TABLET,  # refills: 1   Last office visit: 10/19/2017 with prescribing provider:  10/19/2017   Future Office Visit:     Sig: TAKE 1 TABLET BY MOUTH DAILY    Contraceptives Protocol Passed    2/19/2018 10:13 AM       Passed - Patient is not a current smoker if age is 35 or older       Passed - Recent or future visit with authorizing provider's specialty    Patient had office visit in the last year or has a visit in the next 30 days with authorizing provider.  See \"Patient Info\" tab in inbasket, or \"Choose Columns\" in Meds & Orders section of the refill encounter.            Passed - No active pregnancy on record       Passed - No positive pregnancy test in past 12 months          Kobe POLANCOT  "

## 2018-02-19 NOTE — TELEPHONE ENCOUNTER
PE was due 9/29/17.  Note on request that she moved to [place] and has appt in April and is requesting 3 month RX til that appt.  Overdue for PE.  Sent to provider.  Pati Welsh RN

## 2019-11-07 ENCOUNTER — HEALTH MAINTENANCE LETTER (OUTPATIENT)
Age: 39
End: 2019-11-07

## 2020-02-17 ENCOUNTER — HEALTH MAINTENANCE LETTER (OUTPATIENT)
Age: 40
End: 2020-02-17

## 2020-11-29 ENCOUNTER — HEALTH MAINTENANCE LETTER (OUTPATIENT)
Age: 40
End: 2020-11-29

## 2021-09-25 ENCOUNTER — HEALTH MAINTENANCE LETTER (OUTPATIENT)
Age: 41
End: 2021-09-25

## 2022-01-15 ENCOUNTER — HEALTH MAINTENANCE LETTER (OUTPATIENT)
Age: 42
End: 2022-01-15

## 2022-12-26 ENCOUNTER — HEALTH MAINTENANCE LETTER (OUTPATIENT)
Age: 42
End: 2022-12-26

## 2023-04-16 ENCOUNTER — HEALTH MAINTENANCE LETTER (OUTPATIENT)
Age: 43
End: 2023-04-16